# Patient Record
Sex: FEMALE | NOT HISPANIC OR LATINO | URBAN - METROPOLITAN AREA
[De-identification: names, ages, dates, MRNs, and addresses within clinical notes are randomized per-mention and may not be internally consistent; named-entity substitution may affect disease eponyms.]

---

## 2023-10-24 ENCOUNTER — APPOINTMENT (RX ONLY)
Dept: URBAN - METROPOLITAN AREA CLINIC 45 | Facility: CLINIC | Age: 48
Setting detail: DERMATOLOGY
End: 2023-10-24

## 2023-10-24 DIAGNOSIS — Z41.9 ENCOUNTER FOR PROCEDURE FOR PURPOSES OTHER THAN REMEDYING HEALTH STATE, UNSPECIFIED: ICD-10-CM

## 2023-10-24 DIAGNOSIS — L73.1 PSEUDOFOLLICULITIS BARBAE: ICD-10-CM | Status: INADEQUATELY CONTROLLED

## 2023-10-24 PROCEDURE — ? COSMETIC QUOTE

## 2023-10-24 PROCEDURE — ? PRESCRIPTION

## 2023-10-24 PROCEDURE — ? PRODUCT LINE (REVISION)

## 2023-10-24 PROCEDURE — ? DEFER

## 2023-10-24 PROCEDURE — ? FULL BODY SKIN EXAM - DECLINED

## 2023-10-24 PROCEDURE — ? COUNSELING

## 2023-10-24 PROCEDURE — 99204 OFFICE O/P NEW MOD 45 MIN: CPT

## 2023-10-24 RX ORDER — CLINDAMYCIN PHOSPHATE 10 MG/G
GEL TOPICAL QAM
Qty: 60 | Refills: 4 | Status: ERX | COMMUNITY
Start: 2023-10-24

## 2023-10-24 RX ORDER — TRETIONIN 0.5 MG/G
CREAM TOPICAL
Qty: 45 | Refills: 3 | Status: ERX | COMMUNITY
Start: 2023-10-24

## 2023-10-24 RX ADMIN — CLINDAMYCIN PHOSPHATE: 10 GEL TOPICAL at 00:00

## 2023-10-24 RX ADMIN — TRETIONIN: 0.5 CREAM TOPICAL at 00:00

## 2023-10-24 ASSESSMENT — LOCATION DETAILED DESCRIPTION DERM
LOCATION DETAILED: LEFT CENTRAL MANDIBULAR CHEEK
LOCATION DETAILED: LEFT INFERIOR CENTRAL MALAR CHEEK
LOCATION DETAILED: RIGHT CENTRAL MANDIBULAR CHEEK
LOCATION DETAILED: SUBMENTAL CHIN
LOCATION DETAILED: LEFT SUBMANDIBULAR AREA

## 2023-10-24 ASSESSMENT — LOCATION SIMPLE DESCRIPTION DERM
LOCATION SIMPLE: LEFT SUBMANDIBULAR AREA
LOCATION SIMPLE: LEFT CHEEK
LOCATION SIMPLE: SUBMENTAL CHIN
LOCATION SIMPLE: RIGHT CHEEK
LOCATION SIMPLE: LEFT CHEEK

## 2023-10-24 ASSESSMENT — LOCATION ZONE DERM
LOCATION ZONE: FACE
LOCATION ZONE: FACE

## 2023-10-24 NOTE — PROCEDURE: COSMETIC QUOTE
Laser 8 Units: 0
Injectable 1 Price/Unit (In Dollars- Use Only Numbers And Decimals): 15.00
Face Procedure 2 Price/Unit (In Dollars- Use Only Numbers And Decimals): 900.00
Body Procedure 8: Chemical Peel Body
Anesthesia 6 Price/Unit (In Dollars- Use Only Numbers And Decimals): 0.00
Breast Procedure 1: Arms and Hands CoolPeel
Laser 18 Price/Unit (In Dollars- Use Only Numbers And Decimals): 379.00
Injectable 8: Lyft
Body Procedure 5: Viva Around mouth only
Face Procedure 8: Hydrafacial Deluxe
Laser 15 Price/Unit (In Dollars- Use Only Numbers And Decimals): 499.00
Body Procedure 2: Viva with IPL
Injectable 5: Voluma
Laser 12 Price/Unit (In Dollars- Use Only Numbers And Decimals): 199.00
Face Procedure 6: V-Beam Laser
Injectable 2: Botox lip flip
Laser 9 Price/Unit (In Dollars- Use Only Numbers And Decimals): 200-300
Face Procedure 3: Viva Full Face
Laser 19: Laser Hair Removal Full Back
Laser 2 Price/Unit (In Dollars- Use Only Numbers And Decimals): 1000.00
Laser 16: Laser Hair Removal Chest
Misc Procedure 1: Add-on Milia Extractions
Breast Procedure 2 Price/Unit (In Dollars- Use Only Numbers And Decimals): 450.00
Body Procedure 9 Price/Unit (In Dollars- Use Only Numbers And Decimals): 899.00
Laser 13: Laser Hair Removal Under Arms
Laser 10: Laser hair Removal [place] trail
Anesthesia Fee Units (Optional): 1
Injectable 3: Botox (forehead)
Face Procedure 7: IPL
Send Charges To Patient Encounter: Yes
Laser 10 Price/Unit (In Dollars- Use Only Numbers And Decimals): 99.00
Face Procedure 4: Add-On IPL laser
Laser 20: Laser Hair Removal Buttocks
Include Sales Tax: No
Laser 3 Price/Unit (In Dollars- Use Only Numbers And Decimals): 675.00
Face Procedure 1: VI Precision plus peel
Laser 17: Laser Hair Removal Abdomen
Misc Procedure 2: Milia Extractions
Laser 4 Price/Unit (In Dollars- Use Only Numbers And Decimals): 475.00
Breast Procedure 3 Lopez/Unit (In Dollars- Use Only Numbers And Decimals): 789.00
Injectable 9 Price/Unit (In Dollars- Use Only Numbers And Decimals): 700.00
Laser 14: Laser Hair Removal 1/2 Arms
Body Procedure 7 Price/Unit (In Dollars- Use Only Numbers And Decimals): 1400.00
Laser 11: Laser Hair Removal Lip/Chin
Body Procedure 4 Price/Unit (In Dollars- Use Only Numbers And Decimals): 75.00
Laser 8: CoolPeel Full face
Body Procedure 1 Price/Unit (In Dollars- Use Only Numbers And Decimals): 399.00
Laser 1: Add on IPL
Laser 5: Laser Hair Removal Brazilian
Face Procedure 5 Price/Unit (In Dollars- Use Only Numbers And Decimals): 200.00
Injectable 10: Tear Trough Filler
Laser 15: Laser Hair Removal Full Arms
Laser 12: Laser hair Removal Full Face/ Beard
Breast Procedure 1 Price/Unit (In Dollars- Use Only Numbers And Decimals): 1100.00
Face Procedure 8 Price/Unit (In Dollars- Use Only Numbers And Decimals): 299.00
Laser 9: Cherry Angeomas
Body Procedure 5 Price/Unit (In Dollars- Use Only Numbers And Decimals): 350.00
Body Procedure 2 Price/Unit (In Dollars- Use Only Numbers And Decimals): 800.00
Laser 6: Laser Hair Removal Bikini
Laser 12 Units: 8
Laser 2: Laser Hair Removal Full Body
Face Procedure 6 Price/Unit (In Dollars- Use Only Numbers And Decimals): 300.450
Injectable  11: Volux
Injectable 2 Lopez/Unit (In Dollars- Use Only Numbers And Decimals): 50.00
Breast Procedure 2: CoolPeel spot treatment
Body Procedure 9: Scalp PRP
Face Procedure 3 Price/Unit (In Dollars- Use Only Numbers And Decimals): 550.00
Laser 19 Price/Unit (In Dollars- Use Only Numbers And Decimals): 500.00
Detail Level: Zone
Face Procedure 9: IPL Treatment
Body Procedure 6: CoolPeel
Misc Procedure 1 Price/Unit (In Dollars- Use Only Numbers And Decimals): 25.00
Injectable 6: Juvederm Ultra XC
Body Procedure 3: Viva abdominal Scar
Laser 7: Laser Hair Removal Beard Outline
Laser 3: Laser Hair Removal Full Legs
Breast Procedure 3: Microneedling with PRP
Laser 4: Laser Hair Removal 1/2 Leg
Body Procedure 10: SkinPen Microneedling
Injectable 9: Kybella
Face Procedure 10: SkinPen micronedling
Body Procedure 7: Jane Montoya
Face Procedure 1 Price/Unit (In Dollars- Use Only Numbers And Decimals): 429.00
Misc Procedure 2 Price/Unit (In Dollars- Use Only Numbers And Decimals): 20.00
Body Procedure 4: Add on extractions
Body Procedure 1: Keravive
Injectable 4: Facial Filler
Face Procedure 5: Viva Add on Neck
Injectable 1: Botox
Face Procedure 2: coolpeel laser full face
Laser 18: Laser Hair Removal 1/2 Back

## 2023-10-24 NOTE — HPI: OTHER
Condition:: Hyperpigmentation
Please Describe Your Condition:: Patient reported having darker spots throughout her chin area/jawline area She reported has been going on for a couple of years now starts off as a bump. Patient create some trauma and because of the trauma causes hyperpigmentation. Patient would like further evaluation in office today.

## 2023-10-24 NOTE — PROCEDURE: PRODUCT LINE (REVISION)
Product 45 Application Directions: Deep
Product 17 Price (In Dollars - Numeric Only, No Special Characters Or $): 168.00
Product 34 Price (In Dollars - Numeric Only, No Special Characters Or $): 58.00
Product 29 Application Directions: Apply at night for a softening/moisturizing treatment on lips
Product 6 Application Directions: Apply every night after cleansing, including eye lids.
Product 27 Price (In Dollars - Numeric Only, No Special Characters Or $): 80.00
Name Of Product 15: Brightening facial Wash
Name Of Product 22: Firming Night Treatment
Product 36 Application Directions: Apply to dry skin once per week, add a small amount of water in a circular motion across entire face to exfoliate
Name Of Product 9: DEJ Face Night Cream
Product 13 Price (In Dollars - Numeric Only, No Special Characters Or $): 90.00
Name Of Product 32: Ultra Hydrating Treatment
Allow Plan To Count Towards E/M Coding: Yes
Name Of Product 25: Intellishade Clear
Name Of Product 11: Voumizing / Hydrating Shampoo and Conditioner
Product 4 Units: 0
Name Of Product 49: Obagi neck & Dec
Product 2 Price (In Dollars - Numeric Only, No Special Characters Or $): 84.00
Name Of Product 30: Proscriptix Advanced Corrective Cream
Name Of Product 7: Papaya Enz Cleanser
Product 4 Application Directions: Apply daily in the morning under eyes for puffiness and dark circles
Name Of Product 39: Retinol Complete .05
Name Of Product 37: Super Charged C Serum
Name Of Product 46: Proscriptix Post Procedure Kit
Product 9 Price (In Dollars - Numeric Only, No Special Characters Or $): 178.00
Name Of Product 20: Nectifirm Advanced
Product 46 Application Directions: Use the week follwing every viva or coolpeel treatment
Product 43 Application Directions: No HQ
Product 32 Price (In Dollars - Numeric Only, No Special Characters Or $): 105.00
Product 15 Price (In Dollars - Numeric Only, No Special Characters Or $): 44.00
Product 27 Application Directions: Apply every morning as the last step, before makeup. This is your sunscreen
Product 11 Price (In Dollars - Numeric Only, No Special Characters Or $): 56.00
Product 34 Application Directions: Apply a dime size to entire face 3 to 4 times per week
Product 17 Application Directions: Apply daily in the morning after serums before sunscreen
Product 30 Price (In Dollars - Numeric Only, No Special Characters Or $): 110.00
Product 49 Price (In Dollars - Numeric Only, No Special Characters Or $): 250.00
Name Of Product 5: Vitamin k
Name Of Product 47: Antioxident Infused gentle Cleanser
Product 37 Price (In Dollars - Numeric Only, No Special Characters Or $): 139.00
Product 20 Price (In Dollars - Numeric Only, No Special Characters Or $): 154.00
Product 2 Application Directions: Apply last in regimen daily in the morning
Name Of Product 44: HQ 2%
Name Of Product 28: Gentle Foaming Cleanser
Name Of Product 35: Obagi Retivance
Name Of Product 18: Lumiquin
Product 15 Application Directions: Cleanse face every morning and night. If it starts to dry the skin switch to every other day use.
Name Of Product 14: Nu-Cil
Product 25 Application Directions: Apply a pea size of product to your face every morning as your last step before makeup.
Product 22 Application Directions: Apply every night
Product 32 Application Directions: Apply every morning before lotions.
Name Of Product 3: Obagi Vitamin C with 4% hydro-q
Product 5 Price (In Dollars - Numeric Only, No Special Characters Or $): 62.00
Product 47 Price (In Dollars - Numeric Only, No Special Characters Or $): 46.00
Product 44 Price (In Dollars - Numeric Only, No Special Characters Or $): 18.00
Product 37 Application Directions: Apply every morning before lotions
Product 35 Price (In Dollars - Numeric Only, No Special Characters Or $): 133.00
Product 7 Application Directions: Wash face morning and night
Product 9 Application Directions: Use daily at night after cleansing (Vitamin A)
Product 20 Application Directions: Apply all over neck morning and/or night\\nUnder eye fat pads
Product 18 Price (In Dollars - Numeric Only, No Special Characters Or $): 64.00
Name Of Product 42: Proscriptix Clarifying Brightening Polish
Name Of Product 16: DEJ Eye Cream
Product 14 Price (In Dollars - Numeric Only, No Special Characters Or $): 120.00
Name Of Product 26: REssentials Brightening Pads
Name Of Product 23: DEJ Boosting Serum
Name Of Product 12: ProPil Vitamins
Name Of Product 33: Masood
Product 23 Application Directions: Apply daily morning and night
Name Of Product 50: UpNeeq
Name Of Product 31: REssentials Blemish rescue Serum
Product 5 Application Directions: Apply every morning after Vitamin C, before moisturizer or sunscreen
Name Of Product 40: Intellishade Original
Name Of Product 38: Obagi Acne Cleansing Wipes
Product 26 Price (In Dollars - Numeric Only, No Special Characters Or $): 87.00
Name Of Product 1: C+ Correcting complex 30% vitamin C
Name Of Product 8: Obagi Hydro Drops
Name Of Product 21: Revox 7
Product 16 Price (In Dollars - Numeric Only, No Special Characters Or $): 118.00
Product 28 Application Directions: Use morning and night to cleanse face
Product 23 Price (In Dollars - Numeric Only, No Special Characters Or $): 225.00
Product 35 Application Directions: apply under eye every night
Name Of Product 10: Obagi  4% Hydro-Q
Product 33 Price (In Dollars - Numeric Only, No Special Characters Or $): 158.00
Product 18 Application Directions: Apply daily to hands with a pea size for both, can apply twice per day if desired
Product 50 Price (In Dollars - Numeric Only, No Special Characters Or $): $8
Name Of Product 24: Tretinoin
Product 31 Price (In Dollars - Numeric Only, No Special Characters Or $): 51.00
Product 1 Price (In Dollars - Numeric Only, No Special Characters Or $): 176.00
Name Of Product 6: Revox line relaxer
Product 3 Application Directions: Use daily in the morning (swap with Revision Vitamin C once desired results are achieved / or are taking the break) ( use is for 3 months on and 2 months off)
Name Of Product 48: Obaig Hydrate
Detail Level: Zone
Name Of Product 36: Finishing Touch
Product 8 Price (In Dollars - Numeric Only, No Special Characters Or $): 98.00
Product 21 Price (In Dollars - Numeric Only, No Special Characters Or $): 150.00
Name Of Product 45: Color Science Sunforgettable Brush
Name Of Product 29: Youthful Lip Complex
Product 16 Application Directions: Apply morning and night all over eyes
Product 10 Price (In Dollars - Numeric Only, No Special Characters Or $): 109.00
Name Of Product 19: Nectifirm
Product 24 Price (In Dollars - Numeric Only, No Special Characters Or $): 60.00
Product 38 Price (In Dollars - Numeric Only, No Special Characters Or $): 25.00
Product 12 Application Directions: Take suppliment daily
Product 48 Price (In Dollars - Numeric Only, No Special Characters Or $): 52.00
Product 33 Application Directions: Apply once per week, rinse thoroughly.
Name Of Product 4: Edmundo
Product 36 Price (In Dollars - Numeric Only, No Special Characters Or $): 50.00
Name Of Product 43: Brightening Pads
Product 40 Application Directions: This is your anti-aging, tinted moisturizer for day-time use. Contains SPF of 45
Product 1 Application Directions: Apply daily in the morning and at night
Assigning Risk Information: Per AMA, level of risk is based upon consequences of the problem(s) addressed at the encounter when appropriately treated. Risk also includes medical decision making related to the need to initiate or forego further testing, treatment and/or hospitalization. Over the counter medication are assigned a risk level of low. Prescription medication management is assigned a risk level of moderate.
Name Of Product 17: DEJ Face Cream
Name Of Product 34: Pore Sammy Mask
Product 19 Price (In Dollars - Numeric Only, No Special Characters Or $): 106.00
Product 31 Application Directions: Apply all over or as a spot treatment after cleansing. Use once daily or as needed for acne or folliculitis
Product 14 Application Directions: Eyelash serum, apply first tube daily and apply second tube every 3 days to maintain
Name Of Product 27: Intellishade Matte
Product 21 Application Directions: Apply daily in the morning, especially on picture days
Product 8 Application Directions: Use daily in the morning for Hydration on face, neck, chest and hands.
Name Of Product 13: Serum
Risk Of Complication Category: No MDM
Product 24 Application Directions: Apply a small amount to face nighly, can start with 3 night per week and work up to every night. Avoid eyes.
Product 6 Units: 1
Product 10 Application Directions: Use nightly ( use is for 3 months on and 2 months off)
Product 48 Application Directions: If not getting Revisoin's DEJ
Name Of Product 41: proscriptix Hydrating barrier Repair
Name Of Product 2: Intellishade
Product 46 Price (In Dollars - Numeric Only, No Special Characters Or $): 196.00

## 2023-10-24 NOTE — PROCEDURE: DEFER
Introduction Text (Please End With A Colon): The following procedure was deferred:
Size Of Lesion In Cm (Optional): 0
Reason To Defer Override: patient was advised she could do LASER HAIR REMOVAL to avoid future flare ups.
Detail Level: Detailed

## 2023-12-12 ENCOUNTER — APPOINTMENT (RX ONLY)
Dept: URBAN - METROPOLITAN AREA CLINIC 45 | Facility: CLINIC | Age: 48
Setting detail: DERMATOLOGY
End: 2023-12-12

## 2023-12-12 DIAGNOSIS — Z41.9 ENCOUNTER FOR PROCEDURE FOR PURPOSES OTHER THAN REMEDYING HEALTH STATE, UNSPECIFIED: ICD-10-CM

## 2023-12-12 PROCEDURE — ? LASER HAIR REMOVAL

## 2023-12-12 NOTE — HPI: OTHER
Condition:: Patient presents for LHR\\n
Please Describe Your Condition:: Ppt presents for LHR on full face

## 2023-12-12 NOTE — PROCEDURE: LASER HAIR REMOVAL
Consent: Written consent obtained, risks reviewed including but not limited to crusting, scabbing, blistering, scarring, darker or lighter pigmentary change, paradoxical hair regrowth, incomplete removal of hair and infection.
Laser Type: Nd:Yag 1064nm
Treatment Number: 0
Total Pulses: 199
Post-Procedure Care: Immediate endpoint: perifollicular erythema and edema. Vaseline and ice applied. Post care reviewed with patient.
Fluence (Will Not Render If 0): 20
Spot Size: 15 mm
Tolerated Procedure (Optional): Tolerated Well
External Cooling Fan Speed: 6
Post-Care Instructions: I reviewed with the patient in detail post-care instructions. Patient should avoid sun for a minimum of 4 weeks before and after treatment.
Were Eye Shields Employed?: No
Device Serial Number (Optional): Cynosure Elite, rental unit
Price (Use Numbers Only, No Special Characters Or $): 199.00
Cooling: DCD setting
Eye Shield Text: Given the treatment area eye shields were inserted prior to treatment.
Cooling Override: Olegario 6
Shaving (Optional): The patient was shaved in the office prior to the procedure
External Cooling: Olegario Cryo 5
Topical Anesthesia Type: ice
Fluence (Will Not Render If 0): 25
Anesthesia Type: 1% lidocaine with epinephrine
Treatment Number: 1
Pulse Duration (Include Units): 40 ms
Detail Level: Detailed
Pre-Procedure: Prior to proceeding the treatment areas were cleaned and all present put on their eye protection.

## 2024-01-09 ENCOUNTER — APPOINTMENT (RX ONLY)
Dept: URBAN - METROPOLITAN AREA CLINIC 45 | Facility: CLINIC | Age: 49
Setting detail: DERMATOLOGY
End: 2024-01-09

## 2024-01-09 DIAGNOSIS — Z41.9 ENCOUNTER FOR PROCEDURE FOR PURPOSES OTHER THAN REMEDYING HEALTH STATE, UNSPECIFIED: ICD-10-CM

## 2024-01-09 PROCEDURE — ? ADDITIONAL NOTES

## 2024-01-09 PROCEDURE — ? LASER HAIR REMOVAL

## 2024-01-09 NOTE — PROCEDURE: LASER HAIR REMOVAL
Consent: Written consent obtained, risks reviewed including but not limited to crusting, scabbing, blistering, scarring, darker or lighter pigmentary change, paradoxical hair regrowth, incomplete removal of hair and infection.
Laser Type: Nd:Yag 1064nm
Treatment Number: 0
Total Pulses: 199
Post-Procedure Care: Immediate endpoint: perifollicular erythema and edema. Vaseline and ice applied. Post care reviewed with patient.
Fluence (Will Not Render If 0): 20
Spot Size: 15 mm
Tolerated Procedure (Optional): Tolerated Well
External Cooling Fan Speed: 6
Post-Care Instructions: I reviewed with the patient in detail post-care instructions. Patient should avoid sun for a minimum of 4 weeks before and after treatment.
Were Eye Shields Employed?: No
Device Serial Number (Optional): Cynosure Elite, rental unit
Price (Use Numbers Only, No Special Characters Or $): 199.00
Cooling: DCD setting
Eye Shield Text: Given the treatment area eye shields were inserted prior to treatment.
Cooling Override: Olegario 6
Shaving (Optional): The patient was shaved in the office prior to the procedure
External Cooling: Olegario Cryo 5
Topical Anesthesia Type: ice
Anesthesia Type: 1% lidocaine with epinephrine
Treatment Number: 2
Pulse Duration (Include Units): 40 ms
Detail Level: Detailed
Pre-Procedure: Prior to proceeding the treatment areas were cleaned and all present put on their eye protection.

## 2024-01-09 NOTE — PROCEDURE: ADDITIONAL NOTES
Detail Level: Simple
Render Risk Assessment In Note?: no
Additional Notes: Patient elects to proceed with treatment today. She denies use of photosensitive drugs, no tretinoin use x 1 week. I advised patient of risk of hyperpigmentation with additional treatment. I recommended to either postpone treatment or decrease settings. Patient would like to continue and proceed with treatment with initial settings as she reports improvement of hair growth, finer and less hair. Discussed decreasing fluence to 20 J on upper lip and face, continue initial settings 25 J on chin and neck. Risk of dyspigmentation reviewed with patient. \\nPatient expressed understanding and elected to proceed with treatment.

## 2024-01-23 ENCOUNTER — APPOINTMENT (RX ONLY)
Dept: URBAN - METROPOLITAN AREA CLINIC 45 | Facility: CLINIC | Age: 49
Setting detail: DERMATOLOGY
End: 2024-01-23

## 2024-01-23 DIAGNOSIS — L81.0 POSTINFLAMMATORY HYPERPIGMENTATION: ICD-10-CM

## 2024-01-23 DIAGNOSIS — L73.1 PSEUDOFOLLICULITIS BARBAE: ICD-10-CM | Status: IMPROVED

## 2024-01-23 PROCEDURE — ? PRESCRIPTION MEDICATION MANAGEMENT

## 2024-01-23 PROCEDURE — ? COUNSELING

## 2024-01-23 PROCEDURE — ? PRESCRIPTION

## 2024-01-23 PROCEDURE — 99213 OFFICE O/P EST LOW 20 MIN: CPT

## 2024-01-23 PROCEDURE — ? FULL BODY SKIN EXAM - DECLINED

## 2024-01-23 RX ORDER — PHARMACY COMPOUNDING ACCESSORY
EACH MISCELLANEOUS QHS
Qty: 35 | Refills: 1 | Status: ERX | COMMUNITY
Start: 2024-01-23

## 2024-01-23 RX ADMIN — Medication: at 00:00

## 2024-01-23 ASSESSMENT — LOCATION SIMPLE DESCRIPTION DERM
LOCATION SIMPLE: RIGHT CHEEK
LOCATION SIMPLE: LEFT CHEEK
LOCATION SIMPLE: LEFT SUBMANDIBULAR AREA
LOCATION SIMPLE: SUBMENTAL CHIN
LOCATION SIMPLE: UPPER LIP
LOCATION SIMPLE: RIGHT SUBMANDIBULAR AREA

## 2024-01-23 ASSESSMENT — LOCATION ZONE DERM
LOCATION ZONE: FACE
LOCATION ZONE: LIP

## 2024-01-23 ASSESSMENT — LOCATION DETAILED DESCRIPTION DERM
LOCATION DETAILED: SUBMENTAL CHIN
LOCATION DETAILED: LEFT INFERIOR CENTRAL MALAR CHEEK
LOCATION DETAILED: LEFT SUBMANDIBULAR AREA
LOCATION DETAILED: RIGHT SUPERIOR MEDIAL BUCCAL CHEEK
LOCATION DETAILED: PHILTRUM
LOCATION DETAILED: RIGHT SUBMANDIBULAR AREA

## 2024-02-13 ENCOUNTER — APPOINTMENT (RX ONLY)
Dept: URBAN - METROPOLITAN AREA CLINIC 45 | Facility: CLINIC | Age: 49
Setting detail: DERMATOLOGY
End: 2024-02-13

## 2024-02-13 DIAGNOSIS — Z41.9 ENCOUNTER FOR PROCEDURE FOR PURPOSES OTHER THAN REMEDYING HEALTH STATE, UNSPECIFIED: ICD-10-CM

## 2024-02-13 PROCEDURE — ? LASER HAIR REMOVAL

## 2024-02-13 PROCEDURE — ? ADDITIONAL NOTES

## 2024-02-13 NOTE — PROCEDURE: LASER HAIR REMOVAL
Consent: Written consent obtained, risks reviewed including but not limited to crusting, scabbing, blistering, scarring, darker or lighter pigmentary change, paradoxical hair regrowth, incomplete removal of hair and infection.
Laser Type: Nd:Yag 1064nm
Treatment Number: 0
Total Pulses: 115
Post-Procedure Care: Immediate endpoint: perifollicular erythema and edema. Vaseline and ice applied. Post care reviewed with patient.
Fluence (Will Not Render If 0): 20
Spot Size: 15 mm
Tolerated Procedure (Optional): Tolerated Well
External Cooling Fan Speed: 6
Post-Care Instructions: I reviewed with the patient in detail post-care instructions. Patient should avoid sun for a minimum of 4 weeks before and after treatment.
Were Eye Shields Employed?: No
Device Serial Number (Optional): Cynosure Elite, rental unit
Price (Use Numbers Only, No Special Characters Or $): 199.00
Cooling: DCD setting
Eye Shield Text: Given the treatment area eye shields were inserted prior to treatment.
Cooling Override: Olegario 6
Shaving (Optional): The patient was shaved in the office prior to the procedure
External Cooling: Olegario Cryo 5
Topical Anesthesia Type: ice
Fluence (Will Not Render If 0): 25
Anesthesia Type: 1% lidocaine with epinephrine
Treatment Number: 3
Pulse Duration (Include Units): 40 ms
Detail Level: Detailed
Pre-Procedure: Prior to proceeding the treatment areas were cleaned and all present put on their eye protection.